# Patient Record
Sex: FEMALE | Race: WHITE | Employment: OTHER | URBAN - METROPOLITAN AREA
[De-identification: names, ages, dates, MRNs, and addresses within clinical notes are randomized per-mention and may not be internally consistent; named-entity substitution may affect disease eponyms.]

---

## 2017-11-15 ENCOUNTER — HOSPITAL ENCOUNTER (OUTPATIENT)
Dept: ULTRASOUND IMAGING | Age: 57
Discharge: HOME | End: 2017-11-15
Attending: FAMILY MEDICINE
Payer: COMMERCIAL

## 2017-11-15 DIAGNOSIS — R10.31 RLQ ABDOMINAL PAIN: ICD-10-CM

## 2017-11-15 PROCEDURE — 76830 TRANSVAGINAL US NON-OB: CPT

## 2017-12-12 ENCOUNTER — HOSPITAL ENCOUNTER (OUTPATIENT)
Dept: CT IMAGING | Age: 57
Discharge: HOME | End: 2017-12-12
Attending: FAMILY MEDICINE
Payer: COMMERCIAL

## 2017-12-12 DIAGNOSIS — R10.31 RLQ ABDOMINAL PAIN: ICD-10-CM

## 2017-12-12 PROCEDURE — 74011000258 HC RX REV CODE- 258: Performed by: FAMILY MEDICINE

## 2017-12-12 PROCEDURE — 74011636320 HC RX REV CODE- 636/320: Performed by: FAMILY MEDICINE

## 2017-12-12 PROCEDURE — 74177 CT ABD & PELVIS W/CONTRAST: CPT

## 2017-12-12 RX ORDER — SODIUM CHLORIDE 0.9 % (FLUSH) 0.9 %
10 SYRINGE (ML) INJECTION
Status: COMPLETED | OUTPATIENT
Start: 2017-12-12 | End: 2017-12-12

## 2017-12-12 RX ADMIN — SODIUM CHLORIDE 100 ML: 900 INJECTION, SOLUTION INTRAVENOUS at 08:59

## 2017-12-12 RX ADMIN — DIATRIZOATE MEGLUMINE AND DIATRIZOATE SODIUM 15 ML: 660; 100 LIQUID ORAL; RECTAL at 08:58

## 2017-12-12 RX ADMIN — IOPAMIDOL 100 ML: 755 INJECTION, SOLUTION INTRAVENOUS at 08:58

## 2017-12-12 RX ADMIN — Medication 10 ML: at 08:59

## 2018-03-20 ENCOUNTER — HOSPITAL ENCOUNTER (OUTPATIENT)
Dept: MAMMOGRAPHY | Age: 58
Discharge: HOME | End: 2018-03-20
Attending: OBSTETRICS & GYNECOLOGY
Payer: COMMERCIAL

## 2018-03-20 DIAGNOSIS — Z12.31 VISIT FOR SCREENING MAMMOGRAM: ICD-10-CM

## 2018-03-20 PROCEDURE — 77067 SCR MAMMO BI INCL CAD: CPT

## 2019-06-19 ENCOUNTER — HOSPITAL ENCOUNTER (OUTPATIENT)
Dept: MAMMOGRAPHY | Age: 59
Discharge: HOME | End: 2019-06-19
Attending: OBSTETRICS & GYNECOLOGY
Payer: COMMERCIAL

## 2019-06-19 DIAGNOSIS — Z12.31 VISIT FOR SCREENING MAMMOGRAM: ICD-10-CM

## 2019-06-19 PROCEDURE — 77063 BREAST TOMOSYNTHESIS BI: CPT

## 2020-07-14 ENCOUNTER — HOSPITAL ENCOUNTER (OUTPATIENT)
Dept: MAMMOGRAPHY | Age: 60
Discharge: HOME | End: 2020-07-14
Attending: OBSTETRICS & GYNECOLOGY
Payer: COMMERCIAL

## 2020-07-14 DIAGNOSIS — Z12.31 SCREENING MAMMOGRAM, ENCOUNTER FOR: ICD-10-CM

## 2020-07-14 PROCEDURE — 77063 BREAST TOMOSYNTHESIS BI: CPT

## 2021-07-26 ENCOUNTER — TRANSCRIBE ORDER (OUTPATIENT)
Dept: SCHEDULING | Age: 61
End: 2021-07-26

## 2021-07-26 DIAGNOSIS — Z12.31 SCREENING MAMMOGRAM FOR HIGH-RISK PATIENT: Primary | ICD-10-CM

## 2021-09-30 ENCOUNTER — HOSPITAL ENCOUNTER (OUTPATIENT)
Dept: MAMMOGRAPHY | Age: 61
Discharge: HOME | End: 2021-09-30
Attending: OBSTETRICS & GYNECOLOGY
Payer: COMMERCIAL

## 2021-09-30 DIAGNOSIS — Z12.31 SCREENING MAMMOGRAM FOR HIGH-RISK PATIENT: ICD-10-CM

## 2021-09-30 PROCEDURE — 77063 BREAST TOMOSYNTHESIS BI: CPT

## 2022-10-10 ENCOUNTER — PREP FOR PROCEDURE (OUTPATIENT)
Dept: ADMINISTRATIVE | Age: 62
End: 2022-10-10

## 2022-10-17 RX ORDER — SODIUM CHLORIDE 0.9 % (FLUSH) 0.9 %
5-40 SYRINGE (ML) INJECTION EVERY 12 HOURS SCHEDULED
Status: CANCELLED | OUTPATIENT
Start: 2022-10-17

## 2022-10-17 RX ORDER — SODIUM CHLORIDE 0.9 % (FLUSH) 0.9 %
5-40 SYRINGE (ML) INJECTION PRN
Status: CANCELLED | OUTPATIENT
Start: 2022-10-17

## 2022-10-17 RX ORDER — SODIUM CHLORIDE 9 MG/ML
INJECTION, SOLUTION INTRAVENOUS PRN
Status: CANCELLED | OUTPATIENT
Start: 2022-10-17

## 2022-10-19 NOTE — PERIOP NOTE
Patient verified name, , and procedure. Type: 1a; abbreviated assessment per anesthesia guidelines  Labs per surgeon: none  Labs per anesthesia: none indicated      Instructed pt that they will be notified by the Gi Lab for time of arrival. If any questions please call the GI lab at [phone]. Follow diet and prep instructions per office. May have clear liquids until 4 hours prior to time of arrival.    [place] Roxo or shower the night before and the am of surgery with antibacterial soap. No lotions, oils, powders, cologne on skin. No make up, eye make up or jewelry. Wear loose fitting comfortable, clean clothing. Must have adult present in building the entire time . Medications for the day of procedure Allegra, Fluoxetine. May take ativan if needed    The following discharge instructions reviewed with patient: medication given during procedure may cause drowsiness for several hours, therefore, do not drive or operate machinery for remainder of the day, no alcohol on the day of your procedure, resume regular diet and activity unless otherwise directed, for mild sore throat you may use Cepacol throat lozenges or warm salt water gargles as needed, call your physician for any problems or questions. Patient verbalizes understanding.

## 2022-10-21 ENCOUNTER — ANESTHESIA EVENT (OUTPATIENT)
Dept: ENDOSCOPY | Age: 62
End: 2022-10-21
Payer: COMMERCIAL

## 2022-10-21 ENCOUNTER — HOSPITAL ENCOUNTER (OUTPATIENT)
Age: 62
Setting detail: OUTPATIENT SURGERY
Discharge: HOME | End: 2022-10-21
Attending: INTERNAL MEDICINE | Admitting: INTERNAL MEDICINE
Payer: COMMERCIAL

## 2022-10-21 ENCOUNTER — ANESTHESIA (OUTPATIENT)
Dept: ENDOSCOPY | Age: 62
End: 2022-10-21
Payer: COMMERCIAL

## 2022-10-21 VITALS
SYSTOLIC BLOOD PRESSURE: 125 MMHG | TEMPERATURE: 98.6 F | HEIGHT: 64 IN | WEIGHT: 143 LBS | DIASTOLIC BLOOD PRESSURE: 66 MMHG | HEART RATE: 71 BPM | OXYGEN SATURATION: 100 % | BODY MASS INDEX: 24.41 KG/M2 | RESPIRATION RATE: 18 BRPM

## 2022-10-21 PROCEDURE — 3700000001 HC ADD 15 MINUTES (ANESTHESIA): Performed by: INTERNAL MEDICINE

## 2022-10-21 PROCEDURE — 7100000011 HC PHASE II RECOVERY - ADDTL 15 MIN: Performed by: INTERNAL MEDICINE

## 2022-10-21 PROCEDURE — 2709999900 HC NON-CHARGEABLE SUPPLY: Performed by: INTERNAL MEDICINE

## 2022-10-21 PROCEDURE — 6360000002 HC RX W HCPCS

## 2022-10-21 PROCEDURE — 3700000000 HC ANESTHESIA ATTENDED CARE: Performed by: INTERNAL MEDICINE

## 2022-10-21 PROCEDURE — 2720000010 HC SURG SUPPLY STERILE: Performed by: INTERNAL MEDICINE

## 2022-10-21 PROCEDURE — 3609018500 HC EGD US SCOPE W/ADJACENT STRUCTURES: Performed by: INTERNAL MEDICINE

## 2022-10-21 PROCEDURE — 88312 SPECIAL STAINS GROUP 1: CPT

## 2022-10-21 PROCEDURE — 2580000003 HC RX 258

## 2022-10-21 PROCEDURE — 88305 TISSUE EXAM BY PATHOLOGIST: CPT

## 2022-10-21 PROCEDURE — 2500000003 HC RX 250 WO HCPCS

## 2022-10-21 PROCEDURE — C1753 CATH, INTRAVAS ULTRASOUND: HCPCS | Performed by: INTERNAL MEDICINE

## 2022-10-21 PROCEDURE — 2580000003 HC RX 258: Performed by: ANESTHESIOLOGY

## 2022-10-21 PROCEDURE — 7100000010 HC PHASE II RECOVERY - FIRST 15 MIN: Performed by: INTERNAL MEDICINE

## 2022-10-21 PROCEDURE — 3609017100 HC EGD: Performed by: INTERNAL MEDICINE

## 2022-10-21 RX ORDER — PROPOFOL 10 MG/ML
INJECTION, EMULSION INTRAVENOUS CONTINUOUS PRN
Status: DISCONTINUED | OUTPATIENT
Start: 2022-10-21 | End: 2022-10-21 | Stop reason: SDUPTHER

## 2022-10-21 RX ORDER — LIDOCAINE HYDROCHLORIDE 20 MG/ML
INJECTION, SOLUTION EPIDURAL; INFILTRATION; INTRACAUDAL; PERINEURAL PRN
Status: DISCONTINUED | OUTPATIENT
Start: 2022-10-21 | End: 2022-10-21 | Stop reason: SDUPTHER

## 2022-10-21 RX ORDER — LIDOCAINE HYDROCHLORIDE 10 MG/ML
1 INJECTION, SOLUTION INFILTRATION; PERINEURAL
Status: DISCONTINUED | OUTPATIENT
Start: 2022-10-21 | End: 2022-10-21 | Stop reason: HOSPADM

## 2022-10-21 RX ORDER — SUCRALFATE 1 G/1
1 TABLET ORAL 4 TIMES DAILY
COMMUNITY

## 2022-10-21 RX ORDER — SODIUM CHLORIDE, SODIUM LACTATE, POTASSIUM CHLORIDE, CALCIUM CHLORIDE 600; 310; 30; 20 MG/100ML; MG/100ML; MG/100ML; MG/100ML
INJECTION, SOLUTION INTRAVENOUS CONTINUOUS PRN
Status: DISCONTINUED | OUTPATIENT
Start: 2022-10-21 | End: 2022-10-21 | Stop reason: SDUPTHER

## 2022-10-21 RX ORDER — SODIUM CHLORIDE, SODIUM LACTATE, POTASSIUM CHLORIDE, CALCIUM CHLORIDE 600; 310; 30; 20 MG/100ML; MG/100ML; MG/100ML; MG/100ML
INJECTION, SOLUTION INTRAVENOUS CONTINUOUS
Status: DISCONTINUED | OUTPATIENT
Start: 2022-10-21 | End: 2022-10-21 | Stop reason: HOSPADM

## 2022-10-21 RX ORDER — PROPOFOL 10 MG/ML
INJECTION, EMULSION INTRAVENOUS PRN
Status: DISCONTINUED | OUTPATIENT
Start: 2022-10-21 | End: 2022-10-21 | Stop reason: SDUPTHER

## 2022-10-21 RX ORDER — OMEPRAZOLE 40 MG/1
40 CAPSULE, DELAYED RELEASE ORAL DAILY
COMMUNITY

## 2022-10-21 RX ADMIN — SODIUM CHLORIDE, POTASSIUM CHLORIDE, SODIUM LACTATE AND CALCIUM CHLORIDE: 600; 310; 30; 20 INJECTION, SOLUTION INTRAVENOUS at 12:14

## 2022-10-21 RX ADMIN — SODIUM CHLORIDE, SODIUM LACTATE, POTASSIUM CHLORIDE, AND CALCIUM CHLORIDE: 600; 310; 30; 20 INJECTION, SOLUTION INTRAVENOUS at 12:02

## 2022-10-21 RX ADMIN — PROPOFOL 20 MG: 10 INJECTION, EMULSION INTRAVENOUS at 12:32

## 2022-10-21 RX ADMIN — LIDOCAINE HYDROCHLORIDE 50 MG: 20 INJECTION, SOLUTION EPIDURAL; INFILTRATION; INTRACAUDAL; PERINEURAL at 12:31

## 2022-10-21 RX ADMIN — PROPOFOL 20 MG: 10 INJECTION, EMULSION INTRAVENOUS at 12:34

## 2022-10-21 RX ADMIN — PROPOFOL 200 MCG/KG/MIN: 10 INJECTION, EMULSION INTRAVENOUS at 12:31

## 2022-10-21 RX ADMIN — PROPOFOL 50 MG: 10 INJECTION, EMULSION INTRAVENOUS at 12:31

## 2022-10-21 ASSESSMENT — PAIN - FUNCTIONAL ASSESSMENT: PAIN_FUNCTIONAL_ASSESSMENT: NONE - DENIES PAIN

## 2022-10-21 NOTE — PROGRESS NOTES
1348-To lobby via wheelchair accompanied by staff. Discharged to home in good condition via private vehicle.

## 2022-10-21 NOTE — ANESTHESIA PRE PROCEDURE
Department of Anesthesiology  Preprocedure Note       Name:  Dom Turpin   Age:  65 y.o.  :  1960                                          MRN:  [id number]         Date:  10/21/2022      Surgeon: Mio Hunt):  Lisa Norton MD    Procedure: Procedure(s):  EUS FNA LIVER  EGD ESOPHAGOGASTRODUODENOSCOPY    Medications prior to admission:   Prior to Admission medications    Medication Sig Start Date End Date Taking? Authorizing Provider   omeprazole (PRILOSEC) 40 MG delayed release capsule Take 40 mg by mouth daily   Yes Historical Provider, MD   sucralfate (CARAFATE) 1 GM tablet Take 1 g by mouth 4 times daily   Yes Historical Provider, MD   estradiol (ESTRACE) 0.1 MG/GM vaginal cream Insert vaginally every night for two weeks, then twice weekly at bedtime  Patient not taking: Reported on 10/19/2022 3/1/18   Ar Automatic Reconciliation   fexofenadine (ALLEGRA) 180 MG tablet Take 180 mg by mouth daily 18   Ar Automatic Reconciliation   FLUoxetine (PROZAC) 40 MG capsule 60 mg TAKE 1 CAPSULE BY MOUTH EVERY DAY 18   Ar Automatic Reconciliation   fluticasone (FLONASE) 50 MCG/ACT nasal spray 2 sprays by Nasal route daily  Patient not taking: Reported on 10/19/2022    Ar Automatic Reconciliation   hydrocortisone (ANUSOL-HC) 25 MG suppository Place 25 mg rectally every 12 hours  Patient not taking: Reported on 10/21/2022 11/7/18   Ar Automatic Reconciliation   LORazepam (ATIVAN) 0.5 MG tablet Take 0.5 mg by mouth 2 times daily as needed. 18   Ar Automatic Reconciliation   nitrofurantoin (MACRODANTIN) 50 MG capsule Take 50 mg by mouth as needed  Patient not taking: Reported on 10/21/2022    Ar Automatic Reconciliation   nystatin-triamcinolone (MYCOLOG II) 443315-0.1 UNIT/GM-% cream Apply topically 2 (two) times a day.   Patient not taking: Reported on 10/19/2022 3/1/18   Ar Automatic Reconciliation   pravastatin (PRAVACHOL) 20 MG tablet Take 20 mg by mouth 18   Ar Automatic Reconciliation Current medications:    Current Facility-Administered Medications   Medication Dose Route Frequency Provider Last Rate Last Admin    lidocaine 1 % injection 1 mL  1 mL IntraDERmal Once PRN Angelina Chowdhury MD        lactated ringers infusion   IntraVENous Continuous Angelina Chowdhury  mL/hr at 10/21/22 1214 New Bag at 10/21/22 1214     Facility-Administered Medications Ordered in Other Encounters   Medication Dose Route Frequency Provider Last Rate Last Admin    lactated ringers infusion   IntraVENous Continuous PRN YUN Manuel CRNA 100 mL/hr at 10/21/22 1202 New Bag at 10/21/22 1202       Allergies:  No Known Allergies    Problem List:    Patient Active Problem List   Diagnosis Code    Esophageal reflux K21.9    CTS (carpal tunnel syndrome) G56.00    HLD (hyperlipidemia) E78.5    Acute ITP (HCC) D69.3    PMS (premenstrual syndrome) N94.3    Acne L70.9    Raynaud's phenomenon I73.00    Major depression, recurrent ([address] F33.9       Past Medical History:        Diagnosis Date    Acne 5/13/2013    Acquired asplenia     Acute ITP ([address] 05/13/2013    With pregnancy    Chronic pain     foot pain     CTS (carpal tunnel syndrome) 5/13/2013    Esophageal reflux 5/13/2013    GERD (gastroesophageal reflux disease)     daily medication    History of ITP     HLD (hyperlipidemia) 5/13/2013    Major depression, recurrent ([address] 05/13/2013    on medication    Personal history of COVID-19 08/2022    not hospitalized    PMS (premenstrual syndrome) 5/13/2013    Raynaud's phenomenon 5/13/2013       Past Surgical History:        Procedure Laterality Date    BUNIONECTOMY      OTHER SURGICAL HISTORY  1991    spleen removed    TONSILLECTOMY      T&A    VEIN SURGERY      sclerotherapy       Social History:    Social History     Tobacco Use    Smoking status: Never    Smokeless tobacco: Never   Substance Use Topics    Alcohol use: Yes     Comment: rarely Counseling given: Not Answered      Vital Signs (Current):   Vitals:    10/19/22 1024 10/21/22 1211   BP:  132/74   Pulse:  53   Resp:  17   Temp:  97.9 °F (36.6 °C)   TempSrc:  Oral   SpO2:  100%   Weight: 143 lb (64.9 kg) 143 lb (64.9 kg)   Height: 5' 4\" (1.626 m) 5' 4\" (1.626 m)                                              BP Readings from Last 3 Encounters:   10/21/22 132/74       NPO Status: Time of last liquid consumption: 0800 (sips with meds)                        Time of last solid consumption: 1930                        Date of last liquid consumption: 10/21/22                        Date of last solid food consumption: 10/20/22    BMI:   Wt Readings from Last 3 Encounters:   10/21/22 143 lb (64.9 kg)     Body mass index is 24.55 kg/m². CBC: No results found for: WBC, RBC, HGB, HCT, MCV, RDW, PLT    CMP: No results found for: NA, K, CL, CO2, BUN, CREATININE, GFRAA, AGRATIO, LABGLOM, GLUCOSE, GLU, PROT, CALCIUM, BILITOT, ALKPHOS, AST, ALT    POC Tests: No results for input(s): POCGLU, POCNA, POCK, POCCL, POCBUN, POCHEMO, POCHCT in the last 72 hours.     Coags: No results found for: PROTIME, INR, APTT    HCG (If Applicable): No results found for: PREGTESTUR, PREGSERUM, HCG, HCGQUANT     ABGs: No results found for: PHART, PO2ART, BZE0AXM, XNU6RES, BEART, W5HWXCTV     Type & Screen (If Applicable):  No results found for: LABABO, LABRH    Drug/Infectious Status (If Applicable):  No results found for: HIV, HEPCAB    COVID-19 Screening (If Applicable): No results found for: COVID19        Anesthesia Evaluation    Airway: Mallampati: II  TM distance: >3 FB   Neck ROM: full  Mouth opening: > = 3 FB   Dental: normal exam         Pulmonary:Negative Pulmonary ROS and normal exam  breath sounds clear to auscultation                             Cardiovascular:Negative CV ROS  Exercise tolerance: good (>4 METS),           Rhythm: regular  Rate: normal                    Neuro/Psych:   Negative Neuro/Psych ROS  (+) psychiatric history:            GI/Hepatic/Renal:   (+) GERD: well controlled,           Endo/Other:                      ROS comment: ITP- no recent issues  Raynaud's Abdominal:             Vascular: negative vascular ROS. Other Findings:           Anesthesia Plan      TIVA     ASA 2       Induction: intravenous. Anesthetic plan and risks discussed with patient and spouse.                         Patience Velazquez MD   10/21/2022

## 2022-10-21 NOTE — DISCHARGE INSTRUCTIONS
Gastrointestinal Esophagogastroduodenoscopy (EGD) - Upper Exam Discharge Instructions    1. Call Dr. Angel Luis Lucas at [phone] for any problems or questions. 2. Contact the doctor's office for follow up appointment as directed. 3. Medication may cause drowsiness for several hours, therefore:  Do not drive or operate machinery for remainder of the day. No alcohol today. Do not make any important or legal decisions for 24 hours. Do not sign any legal documents for 24 hours. 5. Ordinarily, you may resume regular diet and activity after exam unless otherwise specified by your physician. 6. For mild soreness in your throat you may use Cepacol throat lozenges or warm  salt-water gargles as needed. Any additional instructions:      1. Follow up results of pathology. 2. Avoid NSAIDs (ie: Ibuprofen, Aspirin, Naproxen, ect.) for 48 hours.

## 2022-10-21 NOTE — H&P
History and Physical for Endoscopic Ultrasound             Date: 10/21/2022     History of Present Illness:  Patient presents to undergo endoscopic ultrasound. Past Medical History:   Diagnosis Date    Acne 5/13/2013    Acquired asplenia     Acute ITP ([facility] [address] [address] 05/13/2013    With pregnancy    Chronic pain     foot pain     CTS (carpal tunnel syndrome) 5/13/2013    Esophageal reflux 5/13/2013    GERD (gastroesophageal reflux disease)     daily medication    History of ITP     HLD (hyperlipidemia) 5/13/2013    Major depression, recurrent ([facility] [address] 05/13/2013    on medication    Personal history of COVID-19 08/2022    not hospitalized    PMS (premenstrual syndrome) 5/13/2013    Raynaud's phenomenon 5/13/2013     Past Surgical History:   Procedure Laterality Date    BUNIONECTOMY      OTHER SURGICAL HISTORY  1991    spleen removed    TONSILLECTOMY      T&A    VEIN SURGERY      sclerotherapy      Family History   Problem Relation Age of Onset    Hypertension Mother     Diabetes Mother     Heart Disease Mother     Breast Cancer Sister 58    Heart Disease Brother     Cancer Maternal Grandmother         breast    Breast Cancer Maternal Grandmother 60    Heart Disease Maternal Grandfather      Social History     Tobacco Use    Smoking status: Never    Smokeless tobacco: Never   Substance Use Topics    Alcohol use: Yes     Comment: rarely        No Known Allergies  No current facility-administered medications for this encounter.      Current Outpatient Medications   Medication Sig    estradiol (ESTRACE) 0.1 MG/GM vaginal cream Insert vaginally every night for two weeks, then twice weekly at bedtime (Patient not taking: Reported on 10/19/2022)    fexofenadine (ALLEGRA) 180 MG tablet Take 180 mg by mouth daily    FLUoxetine (PROZAC) 40 MG capsule 60 mg TAKE 1 CAPSULE BY MOUTH EVERY DAY    fluticasone (FLONASE) 50 MCG/ACT nasal spray 2 sprays by Nasal route daily (Patient not taking: Reported on 10/19/2022)    hydrocortisone

## 2022-10-21 NOTE — OP NOTE
Procedure:  Endoscopic ultrasound with Doppler and Fine needle aspiration    Date of Procedure:  10/21/2022    Patient: Geo Wayne     1960    Indication:  Gastric mass     Sedation:  MAC    Pre-Procedure Physical Exam:    Mental status:  alert and oriented  Airway:  normal oropharyngeal airway and neck mobility  CV:  regular rate and rhythm  Respiratory:  clear to auscultation    Procedure:  A History and Physical has been performed, and patient medication allergies have been reviewed. Risks of perforation, hemorrhage, adverse drug reaction, and aspiration were discussed. Informed consent was obtained for the procedure, including sedation. The patient was placed in the left lateral decubitus position. The heart rate, oxygen saturations, blood pressure, and response to care were monitored throughout the procedure. The linear echoendoscope was passed through the mouth and advanced under direct vision to the distal duodenum. As the scope was slowly withdrawn, detailed endoscopic images were obtained from the surrounding organs. The patient tolerated the procedure well. Findings:     ENDOSCOPIC FINDINGS:  Limited views of the mucosa with the echoendoscope reveals a 2 cm subepithelial lesion in the gastric antrum. There are no other gross abnormalities of the esophagus, stomach or proximal duodenum. STOMACH:  The balloon was insufflated to improve acoustic coupling. Multiple sweeps were made throughout the stomach visualizing the entire wall from the pylorus the gastroesophageal junction. At a distance 41 cm from the incisors, a 27 x 14 mm hypoechoic, homogeneous lesion is seen arising from the muscularis propria but involving also the submucosa. There are no internal calcifications or cystic changes. Using the linear echoendoscope, fine-needle aspiration was performed using a 22-gauge [employer] Ac[employer] needle. Three passes were made, yielding core specimens.  The remainder of

## 2022-10-21 NOTE — ANESTHESIA POSTPROCEDURE EVALUATION
Department of Anesthesiology  Postprocedure Note    Patient:  Med Reed  MRN: [id number]  YOB: 1960  Date of evaluation: 10/21/2022      Procedure Summary     Date: 10/21/22 Room / Location: [facility] ENDO 05 / [facility] ENDOSCOPY    Anesthesia Start: 5172 Anesthesia Stop: 1257    Procedures:       EUS FNA Stomach (Upper GI Region)      EGD ESOPHAGOGASTRODUODENOSCOPY (Upper GI Region) Diagnosis:       Other diseases of stomach and duodenum      (Other diseases of stomach and duodenum [K31.89])    Surgeons: Juhi Granados MD Responsible Provider: Ashley Johnson MD    Anesthesia Type: TIVA ASA Status: 2          Anesthesia Type: TIVA    Carrie Phase I: Carrie Score: 10    Carrie Phase II: Carrie Score: 10      Anesthesia Post Evaluation    Patient location during evaluation: PACU  Patient participation: complete - patient participated  Level of consciousness: awake and alert  Airway patency: patent  Nausea & Vomiting: no nausea and no vomiting  Complications: no  Cardiovascular status: hemodynamically stable  Respiratory status: acceptable, nonlabored ventilation and spontaneous ventilation  Hydration status: euvolemic  Comments: /66   Pulse 71   Temp 98.6 °F (37 °C) (Skin)   Resp 18   Ht 5' 4\" (1.626 m)   Wt 143 lb (64.9 kg)   SpO2 100%   BMI 24.55 kg/m²     Multimodal analgesia pain management approach

## 2022-11-15 ENCOUNTER — HOSPITAL ENCOUNTER (OUTPATIENT)
Dept: MAMMOGRAPHY | Age: 62
Discharge: HOME | End: 2022-11-18
Payer: COMMERCIAL

## 2022-11-15 DIAGNOSIS — Z12.31 VISIT FOR SCREENING MAMMOGRAM: ICD-10-CM

## 2022-11-15 PROCEDURE — 77067 SCR MAMMO BI INCL CAD: CPT

## 2023-10-23 ENCOUNTER — TRANSCRIBE ORDERS (OUTPATIENT)
Dept: SCHEDULING | Age: 63
End: 2023-10-23

## 2023-10-23 DIAGNOSIS — Z12.31 SCREENING MAMMOGRAM FOR HIGH-RISK PATIENT: Primary | ICD-10-CM

## 2023-12-14 ENCOUNTER — HOSPITAL ENCOUNTER (OUTPATIENT)
Dept: MAMMOGRAPHY | Age: 63
Discharge: HOME | End: 2023-12-14
Attending: OBSTETRICS & GYNECOLOGY
Payer: COMMERCIAL

## 2023-12-14 VITALS — WEIGHT: 130 LBS | BODY MASS INDEX: 22.31 KG/M2

## 2023-12-14 DIAGNOSIS — Z12.31 SCREENING MAMMOGRAM FOR HIGH-RISK PATIENT: ICD-10-CM

## 2023-12-14 PROCEDURE — 77063 BREAST TOMOSYNTHESIS BI: CPT

## 2024-10-29 ENCOUNTER — TRANSCRIBE ORDERS (OUTPATIENT)
Dept: SCHEDULING | Age: 64
End: 2024-10-29

## 2024-10-29 DIAGNOSIS — Z12.31 VISIT FOR SCREENING MAMMOGRAM: Primary | ICD-10-CM

## 2024-12-20 ENCOUNTER — HOSPITAL ENCOUNTER (OUTPATIENT)
Dept: MAMMOGRAPHY | Age: 64
Discharge: HOME | End: 2024-12-23
Attending: OBSTETRICS & GYNECOLOGY
Payer: COMMERCIAL

## 2024-12-20 DIAGNOSIS — Z12.31 VISIT FOR SCREENING MAMMOGRAM: ICD-10-CM

## 2024-12-20 PROCEDURE — 77067 SCR MAMMO BI INCL CAD: CPT

## (undated) DEVICE — YANKAUER,BULB TIP,W/O VENT,RIGID,STERILE: Brand: MEDLINE

## (undated) DEVICE — NEEDLE SYR 18GA L1.5IN RED PLAS HUB S STL BLNT FILL W/O

## (undated) DEVICE — BLOCK BITE AD 60FR W/ VELC STRP ADDRESSES MOST PT AND

## (undated) DEVICE — Device: Brand: SUCTION VALVE

## (undated) DEVICE — SYRINGE MED 10ML LUERLOCK TIP W/O SFTY DISP

## (undated) DEVICE — GAUZE,SPONGE,4"X4",12PLY,WOVEN,NS,LF: Brand: MEDLINE

## (undated) DEVICE — MOUTHPIECE ENDOSCP L CTRL OPN AND SIDE PORTS DISP

## (undated) DEVICE — LUBE JELLY FOIL PACK 1.4 OZ: Brand: MEDLINE INDUSTRIES, INC.

## (undated) DEVICE — CANNULA NSL ORAL AD FOR CAPNOFLEX CO2 O2 AIRLFE

## (undated) DEVICE — Device: Brand: BALLOON3

## (undated) DEVICE — SINGLE PORT MANIFOLD: Brand: NEPTUNE 2

## (undated) DEVICE — CONNECTOR TBNG OD5-7MM O2 END DISP

## (undated) DEVICE — ENDOSCOPIC ULTRASOUND FINE NEEDLE BIOPSY (FNB) DEVICE: Brand: ACQUIRE

## (undated) DEVICE — BALLOON US E LIN RNG O KT FOR FG-32UA

## (undated) DEVICE — AIRLIFE™ OXYGEN TUBING 7 FEET (2.1 M) CRUSH RESISTANT OXYGEN TUBING, VINYL TIPPED: Brand: AIRLIFE™

## (undated) DEVICE — KENDALL RADIOLUCENT FOAM MONITORING ELECTRODE RECTANGULAR SHAPE: Brand: KENDALL

## (undated) DEVICE — SYRINGE MED 3ML CLR PLAS STD N CTRL LUERLOCK TIP DISP

## (undated) DEVICE — SYRINGE, LUER SLIP, STERILE, 60ML: Brand: MEDLINE